# Patient Record
Sex: MALE | Race: OTHER | HISPANIC OR LATINO | Employment: FULL TIME | URBAN - NONMETROPOLITAN AREA
[De-identification: names, ages, dates, MRNs, and addresses within clinical notes are randomized per-mention and may not be internally consistent; named-entity substitution may affect disease eponyms.]

---

## 2023-06-10 ENCOUNTER — APPOINTMENT (EMERGENCY)
Dept: CT IMAGING | Facility: HOSPITAL | Age: 28
End: 2023-06-10
Payer: COMMERCIAL

## 2023-06-10 ENCOUNTER — HOSPITAL ENCOUNTER (OUTPATIENT)
Facility: HOSPITAL | Age: 28
Setting detail: OBSERVATION
Discharge: HOME | End: 2023-06-11
Attending: EMERGENCY MEDICINE | Admitting: SURGERY
Payer: COMMERCIAL

## 2023-06-10 DIAGNOSIS — R93.5 ABNORMAL CT OF THE ABDOMEN: Primary | ICD-10-CM

## 2023-06-10 DIAGNOSIS — R10.32 LEFT LOWER QUADRANT ABDOMINAL PAIN: ICD-10-CM

## 2023-06-10 DIAGNOSIS — K66.8 PNEUMOPERITONEUM OF UNKNOWN ETIOLOGY: ICD-10-CM

## 2023-06-10 DIAGNOSIS — K66.8 PNEUMOPERITONEUM: ICD-10-CM

## 2023-06-10 PROBLEM — N20.0 NEPHROLITHIASIS: Status: ACTIVE | Noted: 2022-03-03

## 2023-06-10 PROBLEM — R10.9 ABDOMINAL PAIN: Status: ACTIVE | Noted: 2023-06-10

## 2023-06-10 LAB
ANION GAP SERPL CALCULATED.3IONS-SCNC: 7 MMOL/L (ref 4–13)
BASOPHILS # BLD AUTO: 0.05 THOUSANDS/ÂΜL (ref 0–0.1)
BASOPHILS NFR BLD AUTO: 1 % (ref 0–1)
BILIRUB UR QL STRIP: NEGATIVE
BUN SERPL-MCNC: 17 MG/DL (ref 5–25)
CALCIUM SERPL-MCNC: 10.2 MG/DL (ref 8.4–10.2)
CHLORIDE SERPL-SCNC: 100 MMOL/L (ref 96–108)
CLARITY UR: CLEAR
CO2 SERPL-SCNC: 29 MMOL/L (ref 21–32)
COLOR UR: YELLOW
CREAT SERPL-MCNC: 1.04 MG/DL (ref 0.6–1.3)
EOSINOPHIL # BLD AUTO: 0.18 THOUSAND/ÂΜL (ref 0–0.61)
EOSINOPHIL NFR BLD AUTO: 3 % (ref 0–6)
ERYTHROCYTE [DISTWIDTH] IN BLOOD BY AUTOMATED COUNT: 12.4 % (ref 11.6–15.1)
GFR SERPL CREATININE-BSD FRML MDRD: 97 ML/MIN/1.73SQ M
GLUCOSE SERPL-MCNC: 89 MG/DL (ref 65–140)
GLUCOSE UR STRIP-MCNC: NEGATIVE MG/DL
HCT VFR BLD AUTO: 49.6 % (ref 36.5–49.3)
HGB BLD-MCNC: 16.3 G/DL (ref 12–17)
HGB UR QL STRIP.AUTO: NEGATIVE
IMM GRANULOCYTES # BLD AUTO: 0.03 THOUSAND/UL (ref 0–0.2)
IMM GRANULOCYTES NFR BLD AUTO: 0 % (ref 0–2)
KETONES UR STRIP-MCNC: NEGATIVE MG/DL
LEUKOCYTE ESTERASE UR QL STRIP: NEGATIVE
LYMPHOCYTES # BLD AUTO: 2.39 THOUSANDS/ÂΜL (ref 0.6–4.47)
LYMPHOCYTES NFR BLD AUTO: 34 % (ref 14–44)
MCH RBC QN AUTO: 28.7 PG (ref 26.8–34.3)
MCHC RBC AUTO-ENTMCNC: 32.9 G/DL (ref 31.4–37.4)
MCV RBC AUTO: 87 FL (ref 82–98)
MONOCYTES # BLD AUTO: 0.87 THOUSAND/ÂΜL (ref 0.17–1.22)
MONOCYTES NFR BLD AUTO: 12 % (ref 4–12)
NEUTROPHILS # BLD AUTO: 3.54 THOUSANDS/ÂΜL (ref 1.85–7.62)
NEUTS SEG NFR BLD AUTO: 50 % (ref 43–75)
NITRITE UR QL STRIP: NEGATIVE
NRBC BLD AUTO-RTO: 0 /100 WBCS
PH UR STRIP.AUTO: 6 [PH]
PLATELET # BLD AUTO: 204 THOUSANDS/UL (ref 149–390)
PMV BLD AUTO: 11.5 FL (ref 8.9–12.7)
POTASSIUM SERPL-SCNC: 3.6 MMOL/L (ref 3.5–5.3)
PROT UR STRIP-MCNC: NEGATIVE MG/DL
RBC # BLD AUTO: 5.68 MILLION/UL (ref 3.88–5.62)
SODIUM SERPL-SCNC: 136 MMOL/L (ref 135–147)
SP GR UR STRIP.AUTO: >=1.03 (ref 1–1.03)
UROBILINOGEN UR QL STRIP.AUTO: 0.2 E.U./DL
WBC # BLD AUTO: 7.06 THOUSAND/UL (ref 4.31–10.16)

## 2023-06-10 PROCEDURE — 99284 EMERGENCY DEPT VISIT MOD MDM: CPT

## 2023-06-10 PROCEDURE — 96374 THER/PROPH/DIAG INJ IV PUSH: CPT

## 2023-06-10 PROCEDURE — 85025 COMPLETE CBC W/AUTO DIFF WBC: CPT | Performed by: EMERGENCY MEDICINE

## 2023-06-10 PROCEDURE — 96361 HYDRATE IV INFUSION ADD-ON: CPT

## 2023-06-10 PROCEDURE — 80048 BASIC METABOLIC PNL TOTAL CA: CPT | Performed by: EMERGENCY MEDICINE

## 2023-06-10 PROCEDURE — 74177 CT ABD & PELVIS W/CONTRAST: CPT

## 2023-06-10 PROCEDURE — 36415 COLL VENOUS BLD VENIPUNCTURE: CPT | Performed by: EMERGENCY MEDICINE

## 2023-06-10 PROCEDURE — 81003 URINALYSIS AUTO W/O SCOPE: CPT | Performed by: EMERGENCY MEDICINE

## 2023-06-10 PROCEDURE — G1004 CDSM NDSC: HCPCS

## 2023-06-10 PROCEDURE — 99222 1ST HOSP IP/OBS MODERATE 55: CPT | Performed by: SURGERY

## 2023-06-10 PROCEDURE — 99285 EMERGENCY DEPT VISIT HI MDM: CPT | Performed by: EMERGENCY MEDICINE

## 2023-06-10 RX ORDER — ONDANSETRON 2 MG/ML
4 INJECTION INTRAMUSCULAR; INTRAVENOUS EVERY 6 HOURS PRN
Status: DISCONTINUED | OUTPATIENT
Start: 2023-06-10 | End: 2023-06-11 | Stop reason: HOSPADM

## 2023-06-10 RX ORDER — HEPARIN SODIUM 5000 [USP'U]/ML
5000 INJECTION, SOLUTION INTRAVENOUS; SUBCUTANEOUS EVERY 8 HOURS SCHEDULED
Status: DISCONTINUED | OUTPATIENT
Start: 2023-06-10 | End: 2023-06-11 | Stop reason: HOSPADM

## 2023-06-10 RX ORDER — SODIUM CHLORIDE, SODIUM LACTATE, POTASSIUM CHLORIDE, CALCIUM CHLORIDE 600; 310; 30; 20 MG/100ML; MG/100ML; MG/100ML; MG/100ML
125 INJECTION, SOLUTION INTRAVENOUS CONTINUOUS
Status: DISCONTINUED | OUTPATIENT
Start: 2023-06-10 | End: 2023-06-11 | Stop reason: HOSPADM

## 2023-06-10 RX ORDER — ACETAMINOPHEN 325 MG/1
650 TABLET ORAL EVERY 4 HOURS PRN
Status: DISCONTINUED | OUTPATIENT
Start: 2023-06-10 | End: 2023-06-11 | Stop reason: HOSPADM

## 2023-06-10 RX ORDER — KETOROLAC TROMETHAMINE 30 MG/ML
15 INJECTION, SOLUTION INTRAMUSCULAR; INTRAVENOUS EVERY 6 HOURS SCHEDULED
Status: DISCONTINUED | OUTPATIENT
Start: 2023-06-10 | End: 2023-06-11 | Stop reason: HOSPADM

## 2023-06-10 RX ORDER — KETOROLAC TROMETHAMINE 30 MG/ML
15 INJECTION, SOLUTION INTRAMUSCULAR; INTRAVENOUS ONCE
Status: COMPLETED | OUTPATIENT
Start: 2023-06-10 | End: 2023-06-10

## 2023-06-10 RX ADMIN — KETOROLAC TROMETHAMINE 15 MG: 30 INJECTION, SOLUTION INTRAMUSCULAR; INTRAVENOUS at 18:35

## 2023-06-10 RX ADMIN — SODIUM CHLORIDE 1000 ML: 0.9 INJECTION, SOLUTION INTRAVENOUS at 07:47

## 2023-06-10 RX ADMIN — PIPERACILLIN AND TAZOBACTAM 3.38 G: 3; .375 INJECTION, POWDER, LYOPHILIZED, FOR SOLUTION INTRAVENOUS at 13:24

## 2023-06-10 RX ADMIN — KETOROLAC TROMETHAMINE 15 MG: 30 INJECTION, SOLUTION INTRAMUSCULAR at 07:47

## 2023-06-10 RX ADMIN — HEPARIN SODIUM 5000 UNITS: 5000 INJECTION INTRAVENOUS; SUBCUTANEOUS at 21:16

## 2023-06-10 RX ADMIN — HEPARIN SODIUM 5000 UNITS: 5000 INJECTION INTRAVENOUS; SUBCUTANEOUS at 14:12

## 2023-06-10 RX ADMIN — SODIUM CHLORIDE, SODIUM LACTATE, POTASSIUM CHLORIDE, AND CALCIUM CHLORIDE 125 ML/HR: .6; .31; .03; .02 INJECTION, SOLUTION INTRAVENOUS at 14:12

## 2023-06-10 RX ADMIN — PIPERACILLIN AND TAZOBACTAM 3.38 G: 3; .375 INJECTION, POWDER, LYOPHILIZED, FOR SOLUTION INTRAVENOUS at 18:35

## 2023-06-10 RX ADMIN — IOHEXOL 100 ML: 350 INJECTION, SOLUTION INTRAVENOUS at 08:37

## 2023-06-10 RX ADMIN — KETOROLAC TROMETHAMINE 15 MG: 30 INJECTION, SOLUTION INTRAMUSCULAR; INTRAVENOUS at 14:12

## 2023-06-10 RX ADMIN — SODIUM CHLORIDE, SODIUM LACTATE, POTASSIUM CHLORIDE, AND CALCIUM CHLORIDE 125 ML/HR: .6; .31; .03; .02 INJECTION, SOLUTION INTRAVENOUS at 22:13

## 2023-06-10 NOTE — LETTER
June 11, 2023     Lobo Omta, [address]    Patient: Addi Bailey   YOB: 1995   Date of Visit: 6/10/2023       Dear Dr Ha Fletcher: Thank you for referring Addi Bailey to me for evaluation  Below are my notes for this consultation  If you have questions, please do not hesitate to call me  I look forward to following your patient along with you           Sincerely,        No name on file        CC: No Recipients

## 2023-06-10 NOTE — ED PROVIDER NOTES
History  Chief Complaint   Patient presents with   • Abdominal Pain     Left lower Quadrant pain for 5 years     60-year-old male presents for evaluation of left lower quadrant pain  Pain is sharp in nature, he denies radiation into his back or into his scrotum, denies masses in his scrotum  Patient does have history of kidney stones, last follow-up with urology in 2022  Patient reports he has had this pain for years with intermittent exacerbations  He takes Motrin at home for the pain  He denies nausea or vomiting, fevers or chills  Denies dysuria  Patient does admit that lifting boxes at work makes the pain worse however he does not notice any bulges  He does state the pain often wakes him up from sleep  None       Past Medical History:   Diagnosis Date   • Abdominal pain    • Kidney stone        History reviewed  No pertinent surgical history  History reviewed  No pertinent family history  I have reviewed and agree with the history as documented  E-Cigarette/Vaping     E-Cigarette/Vaping Substances     Social History     Tobacco Use   • Smoking status: Never   • Smokeless tobacco: Never   Substance Use Topics   • Alcohol use: Never   • Drug use: Never       Review of Systems   Constitutional: Negative for activity change, appetite change, chills and fever  Gastrointestinal: Positive for abdominal pain  Negative for constipation, diarrhea, nausea and vomiting  Genitourinary: Negative for dysuria, scrotal swelling and testicular pain  All other systems reviewed and are negative  Physical Exam  Physical Exam  Vitals reviewed  Constitutional:       General: He is not in acute distress  Appearance: He is well-developed  He is not ill-appearing, toxic-appearing or diaphoretic  HENT:      Head: Normocephalic and atraumatic  Mouth/Throat:      Mouth: Mucous membranes are moist    Eyes:      General:         Right eye: No discharge  Left eye: No discharge  Extraocular Movements: Extraocular movements intact  Cardiovascular:      Rate and Rhythm: Normal rate  Abdominal:      General: Bowel sounds are normal       Tenderness: There is abdominal tenderness in the left lower quadrant  There is no right CVA tenderness, left CVA tenderness, guarding or rebound  Hernia: There is no hernia in the umbilical area or ventral area  Musculoskeletal:         General: No deformity or signs of injury  Skin:     General: Skin is warm  Coloration: Skin is not jaundiced or pale  Neurological:      General: No focal deficit present  Mental Status: He is alert  Mental status is at baseline           Vital Signs  ED Triage Vitals [06/10/23 0723]   Temperature Pulse Respirations Blood Pressure SpO2   98 2 °F (36 8 °C) 83 16 125/71 98 %      Temp Source Heart Rate Source Patient Position - Orthostatic VS BP Location FiO2 (%)   Temporal Monitor Lying Left arm --      Pain Score       8           Vitals:    06/10/23 0800 06/10/23 0900 06/10/23 1000 06/10/23 1441   BP: 129/73 100/55 117/67 128/73   Pulse: 70 69 70 72   Patient Position - Orthostatic VS: Lying Lying Lying Sitting         Visual Acuity      ED Medications  Medications   ketorolac (TORADOL) injection 15 mg (15 mg Intravenous Given 6/10/23 1412)   lactated ringers infusion (125 mL/hr Intravenous New Bag 6/10/23 1412)   heparin (porcine) subcutaneous injection 5,000 Units (5,000 Units Subcutaneous Given 6/10/23 1412)   piperacillin-tazobactam (ZOSYN) 3 375 g in sodium chloride 0 9 % 100 mL IVPB (has no administration in time range)   ondansetron (ZOFRAN) injection 4 mg (has no administration in time range)   acetaminophen (TYLENOL) tablet 650 mg (has no administration in time range)   sodium chloride 0 9 % bolus 1,000 mL (0 mL Intravenous Stopped 6/10/23 0955)   ketorolac (TORADOL) injection 15 mg (15 mg Intravenous Given 6/10/23 0747)   iohexol (OMNIPAQUE) 350 MG/ML injection (SINGLE-DOSE) 100 mL (100 mL Intravenous Given 6/10/23 0837)   piperacillin-tazobactam (ZOSYN) IVPB 3 375 g (0 g Intravenous Stopped 6/10/23 1354)       Diagnostic Studies  Results Reviewed     Procedure Component Value Units Date/Time    Platelet count [[id number]]     Lab Status: No result Specimen: Blood from Arm, Left     Basic metabolic panel [[id number]] Collected: 06/10/23 0739    Lab Status: Final result Specimen: Blood from Arm, Left Updated: 06/10/23 0802     Sodium 136 mmol/L      Potassium 3 6 mmol/L      Chloride 100 mmol/L      CO2 29 mmol/L      ANION GAP 7 mmol/L      BUN 17 mg/dL      Creatinine 1 04 mg/dL      Glucose 89 mg/dL      Calcium 10 2 mg/dL      eGFR 97 ml/min/1 73sq m     Narrative:      [place] guidelines for Chronic Kidney Disease (CKD):   •  Stage 1 with normal or high GFR (GFR > 90 mL/min/1 73 square meters)  •  Stage 2 Mild CKD (GFR = 60-89 mL/min/1 73 square meters)  •  Stage 3A Moderate CKD (GFR = 45-59 mL/min/1 73 square meters)  •  Stage 3B Moderate CKD (GFR = 30-44 mL/min/1 73 square meters)  •  Stage 4 Severe CKD (GFR = 15-29 mL/min/1 73 square meters)  •  Stage 5 End Stage CKD (GFR <15 mL/min/1 73 square meters)  Note: GFR calculation is accurate only with a steady state creatinine    CBC and differential [[id number]]  (Abnormal) Collected: 06/10/23 0739    Lab Status: Final result Specimen: Blood from Arm, Left Updated: 06/10/23 0745     WBC 7 06 Thousand/uL      RBC 5 68 Million/uL      Hemoglobin 16 3 g/dL      Hematocrit 49 6 %      MCV 87 fL      MCH 28 7 pg      MCHC 32 9 g/dL      RDW 12 4 %      MPV 11 5 fL      Platelets 053 Thousands/uL      nRBC 0 /100 WBCs      Neutrophils Relative 50 %      Immat GRANS % 0 %      Lymphocytes Relative 34 %      Monocytes Relative 12 %      Eosinophils Relative 3 %      Basophils Relative 1 %      Neutrophils Absolute 3 54 Thousands/µL      Immature Grans Absolute 0 03 Thousand/uL      Lymphocytes Absolute 2 39 Thousands/µL Monocytes Absolute 0 87 Thousand/µL      Eosinophils Absolute 0 18 Thousand/µL      Basophils Absolute 0 05 Thousands/µL     UA w Reflex to Microscopic w Reflex to Culture [[id number]] Collected: 06/10/23 0740    Lab Status: Final result Specimen: Urine, Clean Catch Updated: 06/10/23 0745     Color, UA Yellow     Clarity, UA Clear     Specific Gravity, UA >=1 030     pH, UA 6 0     Leukocytes, UA Negative     Nitrite, UA Negative     Protein, UA Negative mg/dl      Glucose, UA Negative mg/dl      Ketones, UA Negative mg/dl      Urobilinogen, UA 0 2 E U /dl      Bilirubin, UA Negative     Occult Blood, UA Negative                 CT abdomen pelvis with contrast   Final Result by Sandip Mauricio MD (06/10 1038)      No etiology for left lower quadrant pain  Tiny nonobstructing left renal calculi  Minimal right upper quadrant pneumoperitoneum, with tiny extraluminal air bubbles adjacent to the hepatic flexure of the colon, however the colon itself appears unremarkable  Findings may reflect a microperforation of the colon  No evidence of fluid    collection or ascites              I personally discussed this study with Sarah Hayes on 6/10/2023 10:30 AM             Workstation performed: [id number]         XR abdomen obstruction series    (Results Pending)              Procedures  Procedures         ED Course  ED Course as of 06/10/23 1556   Sat Tristan 10, 2023   0724 Blood Pressure: 125/71   0724 Temperature: 98 2 °F (36 8 °C)   0724 Temp Source: Temporal   0724 Pulse: 83   0724 SpO2: 98 %   0724 Respirations: 16   0748 UA w Reflex to Microscopic w Reflex to Culture  No evidence of UTI   0748 CBC and differential(!)  unremarkable   3571 Basic metabolic panel  Within normal   1032 CT abdomen pelvis with contrast  Call from reading room, Dr Lenard Garner: incidental--minimal free air in upper quadrant, adjacent to hepatic flexure; no clear source   1046 CT abdomen pelvis with contrast  Reached out to Dr Gia Garcia with general surgery, will have surgerical AP evaluate pt  Pt updated to results of CT and abnormal findings  States only pain in LLQ, sometimes radiates across lower but no upper pain  1257 Surgery to admit for abx, pain control  Will touch base with GI at [facility]  SBIRT 22yo+    Flowsheet Row Most Recent Value   Initial Alcohol Screen: US AUDIT-C     1  How often do you have a drink containing alcohol? 0 Filed at: 06/10/2023 0723   2  How many drinks containing alcohol do you have on a typical day you are drinking? 0 Filed at: 06/10/2023 0723   3a  Male UNDER 65: How often do you have five or more drinks on one occasion? 0 Filed at: 06/10/2023 0723   3b  FEMALE Any Age, or MALE 65+: How often do you have 4 or more drinks on one occassion? 0 Filed at: 06/10/2023 0723   Audit-C Score 0 Filed at: 06/10/2023 7781   ELIEL: How many times in the past year have you    Used an illegal drug or used a prescription medication for non-medical reasons? Never Filed at: 06/10/2023 1235                    Medical Decision Making  23-year-old male presents for evaluation of left lower quadrant abdominal pain  Per chart review patient has history of kidney stones, is possible he may be passing stones as there were discrepancy in measurements at 1 point  Patient's symptoms may be consistent with kidney stone, other differential may include diverticulitis, hernia  Doubt other etiologies such as bowel obstruction, volvulus  Amount and/or Complexity of Data Reviewed  Labs: ordered  Decision-making details documented in ED Course  Radiology: ordered  Decision-making details documented in ED Course  Risk  Prescription drug management  Decision regarding hospitalization            Disposition  Final diagnoses:   Abnormal CT of the abdomen   Pneumoperitoneum     Time reflects when diagnosis was documented in both MDM as applicable and the Disposition within this note     Time User Action Codes Description Comment    6/10/2023 12:43 PM Estephania Morris Add [R93 5] Abnormal CT of the abdomen     6/10/2023 12:43 PM Estephania Morris Add [K66 8] Pneumoperitoneum     6/10/2023  1:20 PM Enma Mehta Add [K66 8] Pneumoperitoneum of unknown etiology     6/10/2023  1:20 PM Enma Mehta Add [R10 32] Left lower quadrant abdominal pain       ED Disposition     ED Disposition   Admit    Condition   Stable    Date/Time   Sat Tristan 10, 2023 12:58 PM    Comment   Case was discussed with General Surgery and the patient's admission status was agreed to be Admission Status: observation status to the service of Dr Jorge Mario   Follow-up Information    None         There are no discharge medications for this patient  No discharge procedures on file      PDMP Review     None          ED Provider  Electronically Signed by           Alfredo Nicolas DO  06/10/23 155

## 2023-06-10 NOTE — PLAN OF CARE
Problem: PAIN - ADULT  Goal: Verbalizes/displays adequate comfort level or baseline comfort level  Description: Interventions:  - Encourage patient to monitor pain and request assistance  - Assess pain using appropriate pain scale  - Administer analgesics based on type and severity of pain and evaluate response  - Implement non-pharmacological measures as appropriate and evaluate response  - Consider cultural and social influences on pain and pain management  - Notify physician/advanced practitioner if interventions unsuccessful or patient reports new pain  Outcome: Progressing     Problem: INFECTION - ADULT  Goal: Absence or prevention of progression during hospitalization  Description: INTERVENTIONS:  - Assess and monitor for signs and symptoms of infection  - Monitor lab/diagnostic results  - Monitor all insertion sites, i e  indwelling lines, tubes, and drains  - Monitor endotracheal if appropriate and nasal secretions for changes in amount and color  - [place] appropriate cooling/warming therapies per order  - Administer medications as ordered  - Instruct and encourage patient and family to use good hand hygiene technique  - Identify and instruct in appropriate isolation precautions for identified infection/condition  Outcome: Progressing  Goal: Absence of fever/infection during neutropenic period  Description: INTERVENTIONS:  - Monitor WBC    Outcome: Progressing     Problem: SAFETY ADULT  Goal: Patient will remain free of falls  Description: INTERVENTIONS:  - Educate patient/family on patient safety including physical limitations  - Instruct patient to call for assistance with activity   - Consult OT/PT to assist with strengthening/mobility   - Keep Call bell within reach  - Keep bed low and locked with side rails adjusted as appropriate  - Keep care items and personal belongings within reach  - Initiate and maintain comfort rounds  - Make Fall Risk Sign visible to staff  Outcome: Progressing     Problem: DISCHARGE PLANNING  Goal: Discharge to home or other facility with appropriate resources  Description: INTERVENTIONS:  - Identify barriers to discharge w/patient and caregiver  - Arrange for needed discharge resources and transportation as appropriate  - Identify discharge learning needs (meds, wound care, etc )  - Arrange for interpretive services to assist at discharge as needed  - Refer to Case Management Department for coordinating discharge planning if the patient needs post-hospital services based on physician/advanced practitioner order or complex needs related to functional status, cognitive ability, or social support system  Outcome: Progressing     Problem: Knowledge Deficit  Goal: Patient/family/caregiver demonstrates understanding of disease process, treatment plan, medications, and discharge instructions  Description: Complete learning assessment and assess knowledge base    Interventions:  - Provide teaching at level of understanding  - Provide teaching via preferred learning methods  Outcome: Progressing     Problem: GASTROINTESTINAL - ADULT  Goal: Minimal or absence of nausea and/or vomiting  Description: INTERVENTIONS:  - Administer IV fluids if ordered to ensure adequate hydration  - Maintain NPO status until nausea and vomiting are resolved  - Nasogastric tube if ordered  - Administer ordered antiemetic medications as needed  - Provide nonpharmacologic comfort measures as appropriate  - Advance diet as tolerated, if ordered  - Consider nutrition services referral to assist patient with adequate nutrition and appropriate food choices  Outcome: Progressing  Goal: Maintains adequate nutritional intake  Description: INTERVENTIONS:  - Monitor percentage of each meal consumed  - Identify factors contributing to decreased intake, treat as appropriate  - Assist with meals as needed  - Monitor I&O, weight, and lab values if indicated  - Obtain nutrition services referral as needed  Outcome: Progressing Yes

## 2023-06-10 NOTE — H&P
H&P Exam - General Surgery   Candelaria Bahena 33 y o  male MRN: [id number]  Unit/Bed#: RM01 Encounter: [id number]    Assessment/Plan     Assessment:  The patient is a 81-year-old male with no significant past medical history presenting with a 5-year history of chronic recurrent lower abdominal pain which has progressed over the past 5 days prompting his presentation to the [address] [place] [address] emergency department  Here his evaluation has included history, physical examination serum blood work and a CT which suggested the presence of a small focus of free air adjacent to the liver in the right upper quadrant  Etiology of his pneumoperitoneum is not defined by the CT imaging  On physical examination he is well-appearing  Pleasant competent reliable as a historian  He is resting comfortably in his hospital bed and in no distress  He has a benign abdominal examination with mild tenderness to percussion and palpation limited to the left lower quadrant  There are no peritoneal signs  Plan:  Etiology of the patient's symptom complex is uncertain  I believe inflammatory bowel disease must be considered high on the differential diagnosis  I have advised the patient to be admitted to the hospital for supportive care including IV hydration, IV antibiotics and anti-inflammatories  Gastroenterology will be consulted for additional diagnostic and therapeutic treatment recommendations  All questions answered to the satisfaction of the patient who is in agreement with treatment plan outlined above  History of Present Illness   HPI:  Candelaria Bahena is a 33 y o  male who presents with 5-year history of chronic recurrent abdominal pain of uncertain etiology and pneumoperitoneum on today's CT for which inpatient admission is now indicated  Review of Systems   Constitutional: Negative for chills and fever  HENT: Negative for ear pain and sore throat  Eyes: Negative for pain and visual disturbance  Respiratory: Negative for cough and shortness of breath  Cardiovascular: Negative for chest pain and palpitations  Gastrointestinal: Positive for abdominal pain  Negative for constipation, diarrhea, nausea and vomiting  Patient reports a 5-year history of chronic recurrent lower abdominal pain  He has never seen a gastroenterologist   He has never undergone EGD or colonoscopy  Genitourinary: Negative for dysuria and hematuria  Patient has a history of kidney stones  He has been seen by urologist    Musculoskeletal: Negative for arthralgias and back pain  Skin: Negative for color change and rash  Neurological: Negative for seizures and syncope  Hematological: Negative  Psychiatric/Behavioral: Positive for dysphoric mood  Patient reports that his chronic abdominal pain is a source of depression   All other systems reviewed and are negative  Historical Information   Past Medical History:   Diagnosis Date   • Abdominal pain    • Kidney stone      History reviewed  No pertinent surgical history    Social History   Social History     Substance and Sexual Activity   Alcohol Use Never     Social History     Substance and Sexual Activity   Drug Use Never     Social History     Tobacco Use   Smoking Status Never   Smokeless Tobacco Never     E-Cigarette/Vaping     E-Cigarette/Vaping Substances     Family History: non-contributory    Meds/Allergies   all medications and allergies reviewed  No Known Allergies    Objective   First Vitals:   Blood Pressure: 125/71 (06/10/23 0723)  Pulse: 83 (06/10/23 0723)  Temperature: 98 2 °F (36 8 °C) (06/10/23 0723)  Temp Source: Temporal (06/10/23 0723)  Respirations: 16 (06/10/23 0723)  SpO2: 98 % (06/10/23 0723)    Current Vitals:   Blood Pressure: 117/67 (06/10/23 1000)  Pulse: 70 (06/10/23 1000)  Temperature: 98 2 °F (36 8 °C) (06/10/23 0723)  Temp Source: Temporal (06/10/23 0723)  Respirations: 16 (06/10/23 1000)  SpO2: 97 % (06/10/23 1000)      Intake/Output Summary (Last 24 hours) at 6/10/2023 1313  Last data filed at 6/10/2023 0955  Gross per 24 hour   Intake 1000 ml   Output --   Net 1000 ml       Invasive Devices     Peripheral Intravenous Line  Duration           Peripheral IV 06/10/23 Right;Ventral (anterior) Forearm <1 day                Physical Exam  Vitals and nursing note reviewed  Constitutional:       General: He is not in acute distress  Appearance: He is well-developed  HENT:      Head: Normocephalic and atraumatic  Nose: Nose normal       Mouth/Throat:      Mouth: Mucous membranes are moist       Pharynx: Oropharynx is clear  Eyes:      Conjunctiva/sclera: Conjunctivae normal    Cardiovascular:      Rate and Rhythm: Normal rate and regular rhythm  Heart sounds: No murmur heard  Pulmonary:      Effort: Pulmonary effort is normal  No respiratory distress  Breath sounds: Normal breath sounds  Abdominal:      Palpations: Abdomen is soft  Tenderness: There is no abdominal tenderness  Comments: Benign abdominal examination with mild tenderness to percussion and palpation in the left lower quadrant  No guarding rebound or peritoneal signs  No right upper quadrant pain to percussion or palpation  No masses noted no hernias appreciated  Musculoskeletal:         General: No swelling  Cervical back: Neck supple  Skin:     General: Skin is warm and dry  Capillary Refill: Capillary refill takes less than 2 seconds  Neurological:      Mental Status: He is alert  Psychiatric:         Mood and Affect: Mood normal          Lab Results: I have personally reviewed pertinent lab results  Imaging: I have personally reviewed pertinent reports  EKG, Pathology, and Other Studies: I have personally reviewed pertinent reports        Code Status: No Order  Advance Directive and Living Will:      Power of :    POLST:      Counseling / Coordination of Care  Total floor / unit time spent today 35 minutes  Greater than 50% of total time was spent with the patient and / or family counseling and / or coordination of care  A description of the counseling / coordination of care: 15

## 2023-06-11 ENCOUNTER — APPOINTMENT (OUTPATIENT)
Dept: RADIOLOGY | Facility: HOSPITAL | Age: 28
End: 2023-06-11
Payer: COMMERCIAL

## 2023-06-11 VITALS
RESPIRATION RATE: 16 BRPM | SYSTOLIC BLOOD PRESSURE: 108 MMHG | TEMPERATURE: 98.3 F | OXYGEN SATURATION: 97 % | WEIGHT: 146.61 LBS | HEIGHT: 65 IN | HEART RATE: 68 BPM | BODY MASS INDEX: 24.43 KG/M2 | DIASTOLIC BLOOD PRESSURE: 55 MMHG

## 2023-06-11 PROBLEM — R10.9 ABDOMINAL PAIN: Status: RESOLVED | Noted: 2023-06-10 | Resolved: 2023-06-11

## 2023-06-11 LAB
ANION GAP SERPL CALCULATED.3IONS-SCNC: 7 MMOL/L (ref 4–13)
BASOPHILS # BLD AUTO: 0.04 THOUSANDS/ÂΜL (ref 0–0.1)
BASOPHILS NFR BLD AUTO: 1 % (ref 0–1)
BUN SERPL-MCNC: 15 MG/DL (ref 5–25)
CALCIUM SERPL-MCNC: 9 MG/DL (ref 8.4–10.2)
CHLORIDE SERPL-SCNC: 105 MMOL/L (ref 96–108)
CO2 SERPL-SCNC: 25 MMOL/L (ref 21–32)
CREAT SERPL-MCNC: 0.95 MG/DL (ref 0.6–1.3)
EOSINOPHIL # BLD AUTO: 0.12 THOUSAND/ÂΜL (ref 0–0.61)
EOSINOPHIL NFR BLD AUTO: 2 % (ref 0–6)
ERYTHROCYTE [DISTWIDTH] IN BLOOD BY AUTOMATED COUNT: 12.3 % (ref 11.6–15.1)
GFR SERPL CREATININE-BSD FRML MDRD: 109 ML/MIN/1.73SQ M
GLUCOSE SERPL-MCNC: 84 MG/DL (ref 65–140)
HCT VFR BLD AUTO: 42.7 % (ref 36.5–49.3)
HGB BLD-MCNC: 14.3 G/DL (ref 12–17)
IMM GRANULOCYTES # BLD AUTO: 0 THOUSAND/UL (ref 0–0.2)
IMM GRANULOCYTES NFR BLD AUTO: 0 % (ref 0–2)
LYMPHOCYTES # BLD AUTO: 2.29 THOUSANDS/ÂΜL (ref 0.6–4.47)
LYMPHOCYTES NFR BLD AUTO: 35 % (ref 14–44)
MAGNESIUM SERPL-MCNC: 1.8 MG/DL (ref 1.9–2.7)
MCH RBC QN AUTO: 29.2 PG (ref 26.8–34.3)
MCHC RBC AUTO-ENTMCNC: 33.5 G/DL (ref 31.4–37.4)
MCV RBC AUTO: 87 FL (ref 82–98)
MONOCYTES # BLD AUTO: 0.69 THOUSAND/ÂΜL (ref 0.17–1.22)
MONOCYTES NFR BLD AUTO: 11 % (ref 4–12)
NEUTROPHILS # BLD AUTO: 3.32 THOUSANDS/ÂΜL (ref 1.85–7.62)
NEUTS SEG NFR BLD AUTO: 51 % (ref 43–75)
NRBC BLD AUTO-RTO: 0 /100 WBCS
PLATELET # BLD AUTO: 169 THOUSANDS/UL (ref 149–390)
PMV BLD AUTO: 11.7 FL (ref 8.9–12.7)
POTASSIUM SERPL-SCNC: 3.9 MMOL/L (ref 3.5–5.3)
RBC # BLD AUTO: 4.9 MILLION/UL (ref 3.88–5.62)
SODIUM SERPL-SCNC: 137 MMOL/L (ref 135–147)
WBC # BLD AUTO: 6.46 THOUSAND/UL (ref 4.31–10.16)

## 2023-06-11 PROCEDURE — 85025 COMPLETE CBC W/AUTO DIFF WBC: CPT | Performed by: SURGERY

## 2023-06-11 PROCEDURE — 80048 BASIC METABOLIC PNL TOTAL CA: CPT | Performed by: SURGERY

## 2023-06-11 PROCEDURE — 83735 ASSAY OF MAGNESIUM: CPT | Performed by: SURGERY

## 2023-06-11 PROCEDURE — 99238 HOSP IP/OBS DSCHRG MGMT 30/<: CPT | Performed by: SURGERY

## 2023-06-11 PROCEDURE — 74022 RADEX COMPL AQT ABD SERIES: CPT

## 2023-06-11 RX ORDER — MULTIVIT,TX WITH IRON,MINERALS
500 TABLET, EXTENDED RELEASE ORAL ONCE
Status: DISCONTINUED | OUTPATIENT
Start: 2023-06-11 | End: 2023-06-11 | Stop reason: HOSPADM

## 2023-06-11 RX ADMIN — PIPERACILLIN AND TAZOBACTAM 3.38 G: 3; .375 INJECTION, POWDER, LYOPHILIZED, FOR SOLUTION INTRAVENOUS at 12:43

## 2023-06-11 RX ADMIN — KETOROLAC TROMETHAMINE 15 MG: 30 INJECTION, SOLUTION INTRAMUSCULAR; INTRAVENOUS at 05:07

## 2023-06-11 RX ADMIN — PIPERACILLIN AND TAZOBACTAM 3.38 G: 3; .375 INJECTION, POWDER, LYOPHILIZED, FOR SOLUTION INTRAVENOUS at 06:00

## 2023-06-11 RX ADMIN — KETOROLAC TROMETHAMINE 15 MG: 30 INJECTION, SOLUTION INTRAMUSCULAR; INTRAVENOUS at 00:06

## 2023-06-11 RX ADMIN — PIPERACILLIN AND TAZOBACTAM 3.38 G: 3; .375 INJECTION, POWDER, LYOPHILIZED, FOR SOLUTION INTRAVENOUS at 00:07

## 2023-06-11 NOTE — ASSESSMENT & PLAN NOTE
6/10/23  CT ABD/P W IV CO: Minimal right upper quadrant pneumoperitoneum, with tiny extraluminal air bubbles adjacent to the hepatic flexure of the colon, however the colon itself appears unremarkable  6/11/23  ABD XR: no air under diaphragm noted  No acute changes  I personally read the imaging study       · Patient denies any symptoms  · Tolerating oral diet  · stooling and voiding accordingly  · Resolved

## 2023-06-11 NOTE — ASSESSMENT & PLAN NOTE
6/10/23  CT ABD/P W IV CO: Minimal right upper quadrant pneumoperitoneum, with tiny extraluminal air bubbles adjacent to the hepatic flexure of the colon, however the colon itself appears unremarkable  6/11/23  ABD XR: no air under diaphragm noted  No acute changes  I personally read the imaging study       · Patient denies any symptoms  · Tolerating oral diet  · stooling and voiding accordingly      Plan:   · Follow up in office  · Follow up with PCP

## 2023-06-11 NOTE — CASE MANAGEMENT
Case Management Discharge Planning Note    Patient name Candelaria Bahena  Location [facility] [id number] MRN [id number]  : 1995 Date 2023       Current Admission Date: 6/10/2023  Current Admission Diagnosis:Pneumoperitoneum of unknown etiology   Patient Active Problem List    Diagnosis Date Noted   • Pneumoperitoneum of unknown etiology 06/10/2023   • Nephrolithiasis 2022      LOS (days): 0  Geometric Mean LOS (GMLOS) (days):   Days to GMLOS:     OBJECTIVE:            Current admission status: Observation   Preferred Pharmacy:   [facility], [address] [place] [address]  Phone: [phone] Fax: [phone]    Primary Care Provider: Patricio Arguello DO    Primary Insurance: [address] [place] [address]  Secondary Insurance:     DISCHARGE DETAILS:chart reviewed  No discharge needs noted  Nurse to review AVS, all follow up providers listed

## 2023-06-11 NOTE — UTILIZATION REVIEW
Initial Clinical Review    Admission: Date/Time/Statement:   Admission Orders (From admission, onward)     Ordered        06/10/23 1258  Place in Observation  Once                      Orders Placed This Encounter   Procedures   • Place in Observation     Standing Status:   Standing     Number of Occurrences:   1     Order Specific Question:   Level of Care     Answer:   Med Surg [16]     ED Arrival Information     Expected   -    Arrival   6/10/2023 07:13    Acuity   Urgent            Means of arrival   Walk-In    Escorted by   Friend    Service   Surgery-General    Admission type   Emergency            Arrival complaint   Abdominal pain           Chief Complaint   Patient presents with   • Abdominal Pain     Left lower Quadrant pain for 5 years       Initial Presentation: 33 y o  male presents to the ED from home with c/o chronic recurrent lower abdominal pain x last 5 days  PMH: chronic recurrent lower abdominal pain x 5 years, kidney stones, depression  In the ED, labs - WNL  Imaging - min RUQ pneumoperitoneum, poss microperforation of colon, no fluid collection/ascites  Treated with IV fluids, IV Toradol x 2, IV antibiotics, sq Heparin  On exam mild tenderness to percussion and palpation limited to the left lower quadrant  There are no peritoneal signs  He is admitted to OBSERVATION status with abd pain - IV fluids, IV antibiotics, scheduled Toradol, clear liquid diet, GI Consult  Date: 6/11:  Afebrile, on room air  Remains on IV fluids, IV antibiotics, scheduled IV Toradol, clear liquid diet  Obstruction series pending       6/11 GI Consult - P     ED Triage Vitals [06/10/23 0723]   Temperature Pulse Respirations Blood Pressure SpO2   98 2 °F (36 8 °C) 83 16 125/71 98 %      Temp Source Heart Rate Source Patient Position - Orthostatic VS BP Location FiO2 (%)   Temporal Monitor Lying Left arm --      Pain Score       8          Wt Readings from Last 1 Encounters:   06/10/23 66 5 kg (146 lb 9 7 oz) Additional Vital Signs:   06/11/23 07:35:04 98 °F (36 7 °C) 72 16 118/67 84 96 % None (Room air) --   06/10/23 21:15:22 98 1 °F (36 7 °C) 60 17 111/52 72 98 % None (Room air) Lying   06/10/23 1914 -- -- -- -- -- -- None (Room air) --   06/10/23 1534 -- -- -- -- -- -- None (Room air) --   06/10/23 14:41:58 98 5 °F (36 9 °C) 72 18 128/73 91 96 % None (Room air) Sitting   06/10/23 1000 -- 70 16 117/67 87 97 % None (Room air) Lying   06/10/23 0900 -- 69 16 100/55 73 99 % None (Room air) Lying   06/10/23 0800 -- 70 16 129/73 92 98 % None (Room air) Lying     Pertinent Labs/Diagnostic Test Results:   CT abdomen pelvis with contrast   Final Result by Ling Baxter MD (06/10 1038)      No etiology for left lower quadrant pain  Tiny nonobstructing left renal calculi  Minimal right upper quadrant pneumoperitoneum, with tiny extraluminal air bubbles adjacent to the hepatic flexure of the colon, however the colon itself appears unremarkable  Findings may reflect a microperforation of the colon  No evidence of fluid    collection or ascites        XR abdomen obstruction series    (Results Pending)            Results from last 7 days   Lab Units 06/11/23  0432 06/10/23  0739   HEMATOCRIT % 42 7 49 6*   HEMOGLOBIN g/dL 14 3 16 3   NEUTROS ABS Thousands/µL 3 32 3 54   PLATELETS Thousands/uL 169 204   WBC Thousand/uL 6 46 7 06         Results from last 7 days   Lab Units 06/11/23  0432 06/10/23  0739   ANION GAP mmol/L 7 7   BUN mg/dL 15 17   CALCIUM mg/dL 9 0 10 2   CHLORIDE mmol/L 105 100   CO2 mmol/L 25 29   CREATININE mg/dL 0 95 1 04   EGFR ml/min/1 73sq m 109 97   POTASSIUM mmol/L 3 9 3 6   MAGNESIUM mg/dL 1 8*  --    SODIUM mmol/L 137 136             Results from last 7 days   Lab Units 06/11/23  0432 06/10/23  0739   GLUCOSE RANDOM mg/dL 84 89     Results from last 7 days   Lab Units 06/10/23  0740   BILIRUBIN UA  Negative   BLOOD UA  Negative   CLARITY UA  Clear   COLOR UA  Yellow   GLUCOSE UA mg/dl Negative KETONES UA mg/dl Negative   LEUKOCYTES UA  Negative   NITRITE UA  Negative   PH UA  6 0   PROTEIN UA mg/dl Negative   SPEC GRAV UA  >=1 030   UROBILINOGEN UA E U /dl 0 2     ED Treatment:   Medication Administration from 06/10/2023 0708 to 06/10/2023 1437       Date/Time Order Dose Route Action     06/10/2023 0747 EDT sodium chloride 0 9 % bolus 1,000 mL 1,000 mL Intravenous New Bag     06/10/2023 0747 EDT ketorolac (TORADOL) injection 15 mg 15 mg Intravenous Given     06/10/2023 0837 EDT iohexol (OMNIPAQUE) 350 MG/ML injection (SINGLE-DOSE) 100 mL 100 mL Intravenous Given     06/10/2023 1324 EDT piperacillin-tazobactam (ZOSYN) IVPB 3 375 g 3 375 g Intravenous New Bag     06/10/2023 1412 EDT ketorolac (TORADOL) injection 15 mg 15 mg Intravenous Given     06/10/2023 1412 EDT lactated ringers infusion 125 mL/hr Intravenous New Bag     06/10/2023 1412 EDT heparin (porcine) subcutaneous injection 5,000 Units 5,000 Units Subcutaneous Given        Past Medical History:   Diagnosis Date   • Abdominal pain    • Kidney stone      Present on Admission:  • Abdominal pain  • Nephrolithiasis  • Pneumoperitoneum of unknown etiology      Admitting Diagnosis: Pneumoperitoneum [K66 8]  Abdominal pain [R10 9]  Abnormal CT of the abdomen [R93 5]  Pneumoperitoneum of unknown etiology [K66 8]  Left lower quadrant abdominal pain [R10 32]  Age/Sex: 33 y o  male  Admission Orders:  Scheduled Medications:  heparin (porcine), 5,000 Units, Subcutaneous, Q8H ROBERT  ketorolac, 15 mg, Intravenous, Q6H ROBERT  piperacillin-tazobactam, 3 375 g, Intravenous, Q6H      Continuous IV Infusions:  lactated ringers, 125 mL/hr, Intravenous, Continuous      PRN Meds:  acetaminophen, 650 mg, Oral, Q4H PRN  ondansetron, 4 mg, Intravenous, Q6H PRN    Incentive spirometry  Obstruction series  IP CONSULT TO ACUTE CARE SURGERY  IP CONSULT TO GASTROENTEROLOGY    Network Utilization Review Department  ATTENTION: Please call with any questions or concerns to [phone] and carefully listen to the prompts so that you are directed to the right person  All voicemails are confidential   Sanjeev Marte all requests for admission clinical reviews, approved or denied determinations and any other requests to dedicated fax number below belonging to the campus where the patient is receiving treatment   List of dedicated fax numbers for the Facilities:  [address] DENIALS (Administrative/Medical Necessity) [phone]   [address] (Maternity/NICU/Pediatrics) [phone]    Nancy Tavares [phone]   [facility]  [phone]   [address] [place] [address] Nim[facility] 28 [phone]   [address] [place] [address] Efrain Avelar [facility] 134 815 [facility] [phone]

## 2023-06-11 NOTE — PLAN OF CARE
Problem: PAIN - ADULT  Goal: Verbalizes/displays adequate comfort level or baseline comfort level  Description: Interventions:  - Encourage patient to monitor pain and request assistance  - Assess pain using appropriate pain scale  - Administer analgesics based on type and severity of pain and evaluate response  - Implement non-pharmacological measures as appropriate and evaluate response  - Consider cultural and social influences on pain and pain management  - Notify physician/advanced practitioner if interventions unsuccessful or patient reports new pain  Outcome: Progressing     Problem: INFECTION - ADULT  Goal: Absence or prevention of progression during hospitalization  Description: INTERVENTIONS:  - Assess and monitor for signs and symptoms of infection  - Monitor lab/diagnostic results  - Monitor all insertion sites, i e  indwelling lines, tubes, and drains  - Monitor endotracheal if appropriate and nasal secretions for changes in amount and color  - [place] appropriate cooling/warming therapies per order  - Administer medications as ordered  - Instruct and encourage patient and family to use good hand hygiene technique  - Identify and instruct in appropriate isolation precautions for identified infection/condition  Outcome: Progressing  Goal: Absence of fever/infection during neutropenic period  Description: INTERVENTIONS:  - Monitor WBC    Outcome: Progressing     Problem: SAFETY ADULT  Goal: Patient will remain free of falls  Description: INTERVENTIONS:  - Educate patient/family on patient safety including physical limitations  - Instruct patient to call for assistance with activity   - Consult OT/PT to assist with strengthening/mobility   - Keep Call bell within reach  - Keep bed low and locked with side rails adjusted as appropriate  - Keep care items and personal belongings within reach  - Initiate and maintain comfort rounds  - Make Fall Risk Sign visible to staff  Outcome: Progressing     Problem: DISCHARGE PLANNING  Goal: Discharge to home or other facility with appropriate resources  Description: INTERVENTIONS:  - Identify barriers to discharge w/patient and caregiver  - Arrange for needed discharge resources and transportation as appropriate  - Identify discharge learning needs (meds, wound care, etc )  - Arrange for interpretive services to assist at discharge as needed  - Refer to Case Management Department for coordinating discharge planning if the patient needs post-hospital services based on physician/advanced practitioner order or complex needs related to functional status, cognitive ability, or social support system  Outcome: Progressing     Problem: Knowledge Deficit  Goal: Patient/family/caregiver demonstrates understanding of disease process, treatment plan, medications, and discharge instructions  Description: Complete learning assessment and assess knowledge base    Interventions:  - Provide teaching at level of understanding  - Provide teaching via preferred learning methods  Outcome: Progressing     Problem: GASTROINTESTINAL - ADULT  Goal: Minimal or absence of nausea and/or vomiting  Description: INTERVENTIONS:  - Administer IV fluids if ordered to ensure adequate hydration  - Maintain NPO status until nausea and vomiting are resolved  - Nasogastric tube if ordered  - Administer ordered antiemetic medications as needed  - Provide nonpharmacologic comfort measures as appropriate  - Advance diet as tolerated, if ordered  - Consider nutrition services referral to assist patient with adequate nutrition and appropriate food choices  Outcome: Progressing  Goal: Maintains adequate nutritional intake  Description: INTERVENTIONS:  - Monitor percentage of each meal consumed  - Identify factors contributing to decreased intake, treat as appropriate  - Assist with meals as needed  - Monitor I&O, weight, and lab values if indicated  - Obtain nutrition services referral as needed  Outcome: Progressing

## 2023-06-11 NOTE — DISCHARGE SUMMARY
[address] [facility] [address]  Discharge- Rosalind Banuelos 1995, 33 y o  male MRN: [id number]  Unit/Bed#: 465-96 Encounter: [id number]  Primary Care Provider: Lauryn Ji DO   Date and time admitted to hospital: 6/10/2023  7:15 AM    * Pneumoperitoneum of unknown etiology  Assessment & Plan  6/10/23  CT ABD/P W IV CO: Minimal right upper quadrant pneumoperitoneum, with tiny extraluminal air bubbles adjacent to the hepatic flexure of the colon, however the colon itself appears unremarkable  6/11/23  ABD XR: no air under diaphragm noted  No acute changes  I personally read the imaging study  · Patient denies any symptoms  · Tolerating oral diet  · stooling and voiding accordingly      Plan:   · Follow up in office  · Follow up with PCP      Nephrolithiasis  Assessment & Plan  CT 6/10/23   KIDNEYS/URETERS: 1 mm calculus in the lower pole left kidney  Bilateral kidneys otherwise unremarkable, with no hydronephrosis  No ureteral calculi     -first noted on 2-2022 via CT    Unchanged  · Follow up with PCP     Abdominal pain-resolved as of 6/11/2023  Assessment & Plan  6/10/23  CT ABD/P W IV CO: Minimal right upper quadrant pneumoperitoneum, with tiny extraluminal air bubbles adjacent to the hepatic flexure of the colon, however the colon itself appears unremarkable  6/11/23  ABD XR: no air under diaphragm noted  No acute changes  I personally read the imaging study       · Patient denies any symptoms  · Tolerating oral diet  · stooling and voiding accordingly  · Resolved           Discharging Physician / Practitioner: Esequiel Bey MD  PCP: Lauryn Ji DO  Admission Date:   Admission Orders (From admission, onward)     Ordered        06/10/23 1258  Place in Observation  Once                      Discharge Date: 06/11/23    Medical Problems     Resolved Problems  Date Reviewed: 6/11/2023          Resolved    Abdominal pain 6/11/2023     Resolved by  Esequiel Bey MD            Procedures Performed:   CT abdomen pelvis with contrast   Final Result      No etiology for left lower quadrant pain  Tiny nonobstructing left renal calculi  Minimal right upper quadrant pneumoperitoneum, with tiny extraluminal air bubbles adjacent to the hepatic flexure of the colon, however the colon itself appears unremarkable  Findings may reflect a microperforation of the colon  No evidence of fluid    collection or ascites              I personally discussed this study with Tereza Gagnon on 6/10/2023 10:30 AM             Workstation performed: [id number]         XR abdomen obstruction series    (Results Pending)   ·   ·     Significant Findings / Test Results:   Results for orders placed or performed during the hospital encounter of 06/10/23   CBC and differential   Result Value Ref Range    WBC 7 06 4 31 - 10 16 Thousand/uL    RBC 5 68 (H) 3 88 - 5 62 Million/uL    Hemoglobin 16 3 12 0 - 17 0 g/dL    Hematocrit 49 6 (H) 36 5 - 49 3 %    MCV 87 82 - 98 fL    MCH 28 7 26 8 - 34 3 pg    MCHC 32 9 31 4 - 37 4 g/dL    RDW 12 4 11 6 - 15 1 %    MPV 11 5 8 9 - 12 7 fL    Platelets 765 575 - 530 Thousands/uL    nRBC 0 /100 WBCs    Neutrophils Relative 50 43 - 75 %    Immat GRANS % 0 0 - 2 %    Lymphocytes Relative 34 14 - 44 %    Monocytes Relative 12 4 - 12 %    Eosinophils Relative 3 0 - 6 %    Basophils Relative 1 0 - 1 %    Neutrophils Absolute 3 54 1 85 - 7 62 Thousands/µL    Immature Grans Absolute 0 03 0 00 - 0 20 Thousand/uL    Lymphocytes Absolute 2 39 0 60 - 4 47 Thousands/µL    Monocytes Absolute 0 87 0 17 - 1 22 Thousand/µL    Eosinophils Absolute 0 18 0 00 - 0 61 Thousand/µL    Basophils Absolute 0 05 0 00 - 0 10 Thousands/µL   Basic metabolic panel   Result Value Ref Range    Sodium 136 135 - 147 mmol/L    Potassium 3 6 3 5 - 5 3 mmol/L    Chloride 100 96 - 108 mmol/L    CO2 29 21 - 32 mmol/L    ANION GAP 7 4 - 13 mmol/L    BUN 17 5 - 25 mg/dL    Creatinine 1 04 0 60 - 1 30 mg/dL    Glucose 89 65 - 140 mg/dL    Calcium 10 2 8 4 - 10 2 mg/dL    eGFR 97 ml/min/1 73sq m   UA w Reflex to Microscopic w Reflex to Culture    Specimen: Urine, Clean Catch   Result Value Ref Range    Color, UA Yellow     Clarity, UA Clear     Specific Gravity, UA >=1 030 1 003 - 1 030    pH, UA 6 0 4 5, 5 0, 5 5, 6 0, 6 5, 7 0, 7 5, 8 0    Leukocytes, UA Negative Negative    Nitrite, UA Negative Negative    Protein, UA Negative Negative mg/dl    Glucose, UA Negative Negative mg/dl    Ketones, UA Negative Negative mg/dl    Urobilinogen, UA 0 2 0 2, 1 0 E U /dl E U /dl    Bilirubin, UA Negative Negative    Occult Blood, UA Negative Negative   CBC and differential   Result Value Ref Range    WBC 6 46 4 31 - 10 16 Thousand/uL    RBC 4 90 3 88 - 5 62 Million/uL    Hemoglobin 14 3 12 0 - 17 0 g/dL    Hematocrit 42 7 36 5 - 49 3 %    MCV 87 82 - 98 fL    MCH 29 2 26 8 - 34 3 pg    MCHC 33 5 31 4 - 37 4 g/dL    RDW 12 3 11 6 - 15 1 %    MPV 11 7 8 9 - 12 7 fL    Platelets 062 369 - 803 Thousands/uL    nRBC 0 /100 WBCs    Neutrophils Relative 51 43 - 75 %    Immat GRANS % 0 0 - 2 %    Lymphocytes Relative 35 14 - 44 %    Monocytes Relative 11 4 - 12 %    Eosinophils Relative 2 0 - 6 %    Basophils Relative 1 0 - 1 %    Neutrophils Absolute 3 32 1 85 - 7 62 Thousands/µL    Immature Grans Absolute 0 00 0 00 - 0 20 Thousand/uL    Lymphocytes Absolute 2 29 0 60 - 4 47 Thousands/µL    Monocytes Absolute 0 69 0 17 - 1 22 Thousand/µL    Eosinophils Absolute 0 12 0 00 - 0 61 Thousand/µL    Basophils Absolute 0 04 0 00 - 0 10 Thousands/µL   Basic metabolic panel   Result Value Ref Range    Sodium 137 135 - 147 mmol/L    Potassium 3 9 3 5 - 5 3 mmol/L    Chloride 105 96 - 108 mmol/L    CO2 25 21 - 32 mmol/L    ANION GAP 7 4 - 13 mmol/L    BUN 15 5 - 25 mg/dL    Creatinine 0 95 0 60 - 1 30 mg/dL    Glucose 84 65 - 140 mg/dL    Calcium 9 0 8 4 - 10 2 mg/dL    eGFR 109 ml/min/1 73sq m   Magnesium   Result Value Ref Range    Magnesium 1 8 (L) 1 9 - 2 7 mg/dL "  ·   ·     Incidental Findings:   · none     Test Results Pending at Discharge (will require follow up):   · none     Outpatient Tests Requested:  · none    Complications:  none    Reason for Admission: abdominal pain with pneumoperitoneum of unknown etiology    Hospital Course:     Tessie Venegas is a 33 y o  male patient who originally presented to the hospital on 6/10/2023 due to abdominal pain and CT scan finding concerning for pneumoperitoneum   Patient continued to be vitally stable  Repeat imaging unremarkable  Currently tolerating oral diet  Asymptomatic  stooling and voiding accordingly     HPI per admission:      a 78-year-old male with no significant past medical history presenting with a 5-year history of chronic recurrent lower abdominal pain which has progressed over the past 5 days prompting his presentation to the [address] [facility] emergency department  The patient, initially admitted to the hospital as inpatient, was discharged earlier than expected given the following: remarkable improvement  resolution of symptoms       Please see above list of diagnoses and related plan for additional information  Condition at Discharge: good     Discharge Day Visit / Exam:     Subjective:  Patient was seen today at bedside  Doesn't have any active complaints  No chest pain or tightness, SOB or cough, dizziness or light headedness, N/V, Diarrhea or constipation  No active urinary symptoms  Tolerating oral diet  Vitals: Blood Pressure: 108/55 (06/11/23 1424)  Pulse: 68 (06/11/23 1424)  Temperature: 98 3 °F (36 8 °C) (06/11/23 1424)  Temp Source: Oral (06/10/23 2115)  Respirations: 16 (06/11/23 1424)  Height: 5' 5\" (165 1 cm) (06/10/23 1441)  Weight - Scale: 66 5 kg (146 lb 9 7 oz) (06/10/23 1441)  SpO2: 97 % (06/11/23 1424)  Exam:   Physical Exam  Vitals reviewed  Constitutional:       General: He is not in acute distress  Appearance: Normal appearance     HENT:      Head: Normocephalic " and atraumatic  Mouth/Throat:      Mouth: Mucous membranes are moist    Eyes:      Conjunctiva/sclera: Conjunctivae normal       Pupils: Pupils are equal, round, and reactive to light  Cardiovascular:      Rate and Rhythm: Normal rate and regular rhythm  Pulses: Normal pulses  Carotid pulses are 2+ on the right side and 2+ on the left side  Radial pulses are 2+ on the right side and 2+ on the left side  Dorsalis pedis pulses are 2+ on the right side and 2+ on the left side  Heart sounds: Normal heart sounds, S1 normal and S2 normal  No murmur heard  Pulmonary:      Effort: No tachypnea, bradypnea or accessory muscle usage  Breath sounds: Normal breath sounds and air entry  No decreased breath sounds, wheezing, rhonchi or rales  Abdominal:      General: Abdomen is flat  Bowel sounds are normal       Palpations: Abdomen is soft  Tenderness: There is no abdominal tenderness  Musculoskeletal:      Right lower leg: No edema  Left lower leg: No edema  Neurological:      Mental Status: He is alert and oriented to person, place, and time  Mental status is at baseline  Discussion with Family: patient and partner    Discharge instructions/Information to patient and family:   See after visit summary for information provided to patient and family  Provisions for Follow-Up Care:  See after visit summary for information related to follow-up care and any pertinent home health orders  Disposition:     Home    For Discharges to [facility]:   · Not Applicable to this Patient - Not Applicable to this Patient    Planned Readmission: none     Discharge Statement:  I spent 45 minutes discharging the patient  This time was spent on the day of discharge  I had direct contact with the patient on the day of discharge   Greater than 50% of the total time was spent examining patient, answering all patient questions, arranging and discussing plan of care with patient as well as directly providing post-discharge instructions  Additional time then spent on discharge activities  Discharge Medications:  See after visit summary for reconciled discharge medications provided to patient and family        ** Please Note: This note has been constructed using a voice recognition system **

## 2023-06-11 NOTE — PLAN OF CARE
Problem: PAIN - ADULT  Goal: Verbalizes/displays adequate comfort level or baseline comfort level  Description: Interventions:  - Encourage patient to monitor pain and request assistance  - Assess pain using appropriate pain scale  - Administer analgesics based on type and severity of pain and evaluate response  - Implement non-pharmacological measures as appropriate and evaluate response  - Consider cultural and social influences on pain and pain management  - Notify physician/advanced practitioner if interventions unsuccessful or patient reports new pain  Outcome: Completed     Problem: INFECTION - ADULT  Goal: Absence or prevention of progression during hospitalization  Description: INTERVENTIONS:  - Assess and monitor for signs and symptoms of infection  - Monitor lab/diagnostic results  - Monitor all insertion sites, i e  indwelling lines, tubes, and drains  - Monitor endotracheal if appropriate and nasal secretions for changes in amount and color  - [place] appropriate cooling/warming therapies per order  - Administer medications as ordered  - Instruct and encourage patient and family to use good hand hygiene technique  - Identify and instruct in appropriate isolation precautions for identified infection/condition  Outcome: Completed  Goal: Absence of fever/infection during neutropenic period  Description: INTERVENTIONS:  - Monitor WBC    Outcome: Completed     Problem: SAFETY ADULT  Goal: Patient will remain free of falls  Description: INTERVENTIONS:  - Educate patient/family on patient safety including physical limitations  - Instruct patient to call for assistance with activity   - Consult OT/PT to assist with strengthening/mobility   - Keep Call bell within reach  - Keep bed low and locked with side rails adjusted as appropriate  - Keep care items and personal belongings within reach  - Initiate and maintain comfort rounds  - Make Fall Risk Sign visible to staff  Outcome: Completed     Problem: DISCHARGE PLANNING  Goal: Discharge to home or other facility with appropriate resources  Description: INTERVENTIONS:  - Identify barriers to discharge w/patient and caregiver  - Arrange for needed discharge resources and transportation as appropriate  - Identify discharge learning needs (meds, wound care, etc )  - Arrange for interpretive services to assist at discharge as needed  - Refer to Case Management Department for coordinating discharge planning if the patient needs post-hospital services based on physician/advanced practitioner order or complex needs related to functional status, cognitive ability, or social support system  Outcome: Completed     Problem: Knowledge Deficit  Goal: Patient/family/caregiver demonstrates understanding of disease process, treatment plan, medications, and discharge instructions  Description: Complete learning assessment and assess knowledge base    Interventions:  - Provide teaching at level of understanding  - Provide teaching via preferred learning methods  Outcome: Completed     Problem: GASTROINTESTINAL - ADULT  Goal: Minimal or absence of nausea and/or vomiting  Description: INTERVENTIONS:  - Administer IV fluids if ordered to ensure adequate hydration  - Maintain NPO status until nausea and vomiting are resolved  - Nasogastric tube if ordered  - Administer ordered antiemetic medications as needed  - Provide nonpharmacologic comfort measures as appropriate  - Advance diet as tolerated, if ordered  - Consider nutrition services referral to assist patient with adequate nutrition and appropriate food choices  Outcome: Completed  Goal: Maintains adequate nutritional intake  Description: INTERVENTIONS:  - Monitor percentage of each meal consumed  - Identify factors contributing to decreased intake, treat as appropriate  - Assist with meals as needed  - Monitor I&O, weight, and lab values if indicated  - Obtain nutrition services referral as needed  Outcome: Completed

## 2023-06-11 NOTE — ASSESSMENT & PLAN NOTE
CT 6/10/23   KIDNEYS/URETERS: 1 mm calculus in the lower pole left kidney  Bilateral kidneys otherwise unremarkable, with no hydronephrosis  No ureteral calculi     -first noted on 2-2022 via CT    Unchanged       · Follow up with PCP